# Patient Record
Sex: FEMALE | Race: ASIAN
[De-identification: names, ages, dates, MRNs, and addresses within clinical notes are randomized per-mention and may not be internally consistent; named-entity substitution may affect disease eponyms.]

---

## 2018-12-01 ENCOUNTER — PRIOR ORIGINAL RECORDS (OUTPATIENT)
Dept: HEALTH INFORMATION MANAGEMENT | Facility: OTHER | Age: 28
End: 2018-12-01

## 2020-01-20 ENCOUNTER — TELEPHONE (OUTPATIENT)
Dept: SCHEDULING | Age: 30
End: 2020-01-20

## 2020-01-22 ENCOUNTER — OFFICE VISIT (OUTPATIENT)
Dept: FAMILY MEDICINE | Age: 30
End: 2020-01-22

## 2020-01-22 VITALS
TEMPERATURE: 98 F | DIASTOLIC BLOOD PRESSURE: 66 MMHG | HEART RATE: 84 BPM | HEIGHT: 60 IN | BODY MASS INDEX: 24.23 KG/M2 | OXYGEN SATURATION: 98 % | SYSTOLIC BLOOD PRESSURE: 98 MMHG | RESPIRATION RATE: 17 BRPM | WEIGHT: 123.4 LBS

## 2020-01-22 DIAGNOSIS — M54.16 LUMBAR RADICULOPATHY, ACUTE: Primary | ICD-10-CM

## 2020-01-22 PROCEDURE — 99203 OFFICE O/P NEW LOW 30 MIN: CPT | Performed by: FAMILY MEDICINE

## 2020-01-22 RX ORDER — IBUPROFEN 600 MG/1
600 TABLET ORAL EVERY 8 HOURS
Qty: 90 TABLET | Refills: 0 | Status: SHIPPED | OUTPATIENT
Start: 2020-01-22

## 2020-01-22 RX ORDER — PREDNISONE 20 MG/1
20 TABLET ORAL 2 TIMES DAILY
Qty: 10 TABLET | Refills: 0 | Status: SHIPPED | OUTPATIENT
Start: 2020-01-22

## 2020-01-22 ASSESSMENT — PATIENT HEALTH QUESTIONNAIRE - PHQ9
SUM OF ALL RESPONSES TO PHQ9 QUESTIONS 1 AND 2: 0
SUM OF ALL RESPONSES TO PHQ9 QUESTIONS 1 AND 2: 0
2. FEELING DOWN, DEPRESSED OR HOPELESS: NOT AT ALL
1. LITTLE INTEREST OR PLEASURE IN DOING THINGS: NOT AT ALL

## 2020-01-22 ASSESSMENT — ENCOUNTER SYMPTOMS
APPETITE CHANGE: 0
ABDOMINAL PAIN: 0
BACK PAIN: 1
EYE DISCHARGE: 0
PERIANAL NUMBNESS: 0
WEIGHT LOSS: 0
BOWEL INCONTINENCE: 0
HEADACHES: 0
ACTIVITY CHANGE: 0
TINGLING: 1
EYE ITCHING: 0
NUMBNESS: 1
WEAKNESS: 0
PARESTHESIAS: 1
PARESIS: 0

## 2020-01-30 ENCOUNTER — TELEPHONE (OUTPATIENT)
Dept: SCHEDULING | Age: 30
End: 2020-01-30

## 2020-02-04 ENCOUNTER — OFFICE VISIT (OUTPATIENT)
Dept: FAMILY MEDICINE | Age: 30
End: 2020-02-04

## 2020-02-04 VITALS
BODY MASS INDEX: 24.15 KG/M2 | OXYGEN SATURATION: 98 % | WEIGHT: 123 LBS | HEIGHT: 60 IN | DIASTOLIC BLOOD PRESSURE: 71 MMHG | SYSTOLIC BLOOD PRESSURE: 113 MMHG | RESPIRATION RATE: 20 BRPM | HEART RATE: 81 BPM | TEMPERATURE: 98 F

## 2020-02-04 DIAGNOSIS — M54.16 LUMBAR RADICULOPATHY, ACUTE: Primary | ICD-10-CM

## 2020-02-04 PROCEDURE — 99213 OFFICE O/P EST LOW 20 MIN: CPT | Performed by: FAMILY MEDICINE

## 2020-02-04 RX ORDER — MELOXICAM 7.5 MG/1
7.5 TABLET ORAL DAILY
Qty: 30 TABLET | Refills: 0 | Status: SHIPPED | OUTPATIENT
Start: 2020-02-04

## 2020-02-04 ASSESSMENT — ENCOUNTER SYMPTOMS
EYE ITCHING: 0
ACTIVITY CHANGE: 0
APPETITE CHANGE: 0
EYE DISCHARGE: 0

## 2020-02-19 ENCOUNTER — HOSPITAL (OUTPATIENT)
Dept: OTHER | Age: 30
End: 2020-02-19
Attending: FAMILY MEDICINE

## 2020-03-01 ENCOUNTER — HOSPITAL (OUTPATIENT)
Dept: OTHER | Age: 30
End: 2020-03-01
Attending: FAMILY MEDICINE

## 2020-03-26 ENCOUNTER — HOSPITAL (OUTPATIENT)
Dept: OTHER | Age: 30
End: 2020-03-26
Attending: FAMILY MEDICINE

## 2020-04-01 ENCOUNTER — HOSPITAL (OUTPATIENT)
Dept: OTHER | Age: 30
End: 2020-04-01
Attending: FAMILY MEDICINE

## 2020-04-02 ENCOUNTER — HOSPITAL (OUTPATIENT)
Dept: OTHER | Age: 30
End: 2020-04-02
Attending: FAMILY MEDICINE

## 2023-12-05 ASSESSMENT — ENCOUNTER SYMPTOMS
EYE REDNESS: 0
POLYPHAGIA: 0
CHILLS: 0
EYE DISCHARGE: 0
HEADACHES: 0
COUGH: 0
SHORTNESS OF BREATH: 0
DIZZINESS: 0
HEMATURIA: 0
FATIGUE: 0
UNEXPECTED WEIGHT CHANGE: 0
POLYDIPSIA: 0
TROUBLE SWALLOWING: 0
BLOOD IN STOOL: 0
PALPITATIONS: 0
APPETITE CHANGE: 0
EYE PAIN: 0
DYSURIA: 0
FEVER: 0
HALLUCINATIONS: 0
BACK PAIN: 0
FREQUENCY: 0
ADENOPATHY: 0
NECK PAIN: 0
SORE THROAT: 0
ABDOMINAL PAIN: 0
CONFUSION: 0
WOUND: 0
BRUISES/BLEEDS EASILY: 0
VOMITING: 0

## 2023-12-05 NOTE — PROGRESS NOTES
"Subjective   Patient ID: Bob Santana is a 33 y.o. female who presents for Annual Exam.    new pt-erika-last pcp, gi, ob gyn, others? Dr. Joseph   Put names of providers in care team-not last pcp  last physical- a year   last labs- awhile ago  is pt fasting? No   Does the pt want a flu shot?  No   Last tdap- 9 months ago   last pap-dr joseph 7/15/22; due 7/2027    How long has back pain been worse? In couple days   Lower back? Lower back into hips. More towards left hip   Any fall or injury? No fall or injury     Family history form      No care team member to display    HPI  Last labs->1yr  Due for labs- all    No results found for: \"CHOL\"  No results found for: \"TRIG\"  No results found for: \"HDL\"  No results found for: \"LDL\"  No results found for: \"TSH\"  No results found for: \"A1C\"  No components found for: \"POCA1C\"  No results found for: \"ALBUR\"  No components found for: \"POCALBUR\"      Other concerns:back pain x 5-6yrs  Worse lower back pain x 2d with radiation into lf hip  When putting baby down spasm of pain and hard to walk  Exercise causes pain  No fall or injury   numbness or tingling down legs lf >rt when pain worse  leg weakness noted lf >rt  Right now pain is4-5/10  Worse if lift baby or bend forward or sitting or lying down  Selftxt:chiropractor    Neck pain on off especially behind lf ear-twitches and lf arm numb  Triggers anxiety  No pain right now    Right now just top of head  Bad HA yesterday    Abd pain and back pain after period    bps at home- none    ER/urgicare visits in the last year- 2 times during pregnancy  Hospitalizations in the last year- none      last Pap- last yr  H/o abn pap-none    FH ovarian, endometrial, cervical, uterine ca-none    Current birth control method-not sexually active right now  No change in contraception desired      FH br ca-none    FH colon ca-none      Exercise- treadmill, elliptical  Diet-3 meals a day   Body mass index is 28.02 kg/m².        last dental appt- 4-5 " yrs    BMs-regular  Sleep-able to fall asleep and stay asleep; no snoring or apnea  no cp, swelling, sob, abd pain, n/v/d/c, blood in stool or black stools  STI testing including hiv (age 15-65) and hep c screening (18-79)-declines        Immunization History   Administered Date(s) Administered    Pfizer Purple Cap SARS-CoV-2 04/22/2021, 05/14/2021, 01/27/2022    Tdap vaccine, age 7 year and older (BOOSTRIX) 02/08/2023       fractures in lifetime-none      FH heart attack, heart surgery-mat gf  FH stroke-none    The ASCVD Risk score (Robel DK, et al., 2019) failed to calculate for the following reasons:    The 2019 ASCVD risk score is only valid for ages 40 to 79  Coronary Artery Calcium score:  This test is recommended for men 45 or older and women 55 or older without a history of heart disease and have 1 risk factor (high LDL cholesterol, low HDL cholesterol, high blood pressure, smoker (current or past), type 2 diabetes, IBD, lupus, RA, ankylosing spondylitis, psoriasis or family history of  heart disease <55yrs in dad, brother or child or <65yrs in mom, sister, or child.)       Review of Systems   Constitutional:  Negative for appetite change, chills, fatigue, fever and unexpected weight change.   HENT:  Negative for congestion, ear pain, sore throat and trouble swallowing.    Eyes:  Negative for pain, discharge and redness.   Respiratory:  Negative for cough and shortness of breath.    Cardiovascular:  Negative for chest pain and palpitations.   Gastrointestinal:  Negative for abdominal pain, blood in stool and vomiting.   Endocrine: Negative for polydipsia, polyphagia and polyuria.   Genitourinary:  Negative for dysuria, frequency, hematuria and urgency.   Musculoskeletal:  Negative for back pain and neck pain.   Skin:  Negative for rash and wound.   Allergic/Immunologic: Negative for immunocompromised state.   Neurological:  Negative for dizziness, syncope and headaches.   Hematological:  Negative for  adenopathy. Does not bruise/bleed easily.   Psychiatric/Behavioral:  Negative for confusion and hallucinations.        Objective   Visit Vitals  /74   Pulse 68   Temp 36.8 °C (98.2 °F)      BP Readings from Last 3 Encounters:   12/06/23 109/74     Wt Readings from Last 3 Encounters:   12/06/23 69.5 kg (153 lb 3.2 oz)           Physical Exam  Constitutional:       General: She is not in acute distress.     Appearance: Normal appearance. She is not ill-appearing.   HENT:      Head: Normocephalic.      Right Ear: Tympanic membrane, ear canal and external ear normal.      Left Ear: Tympanic membrane, ear canal and external ear normal.      Nose: Nose normal.      Mouth/Throat:      Mouth: Mucous membranes are moist.      Pharynx: Oropharynx is clear.   Eyes:      Extraocular Movements: Extraocular movements intact.      Conjunctiva/sclera: Conjunctivae normal.      Pupils: Pupils are equal, round, and reactive to light.   Cardiovascular:      Rate and Rhythm: Normal rate and regular rhythm.      Heart sounds: Normal heart sounds. No murmur heard.  Pulmonary:      Effort: Pulmonary effort is normal. No respiratory distress.      Breath sounds: Normal breath sounds. No wheezing, rhonchi or rales.   Abdominal:      General: Bowel sounds are normal.      Palpations: Abdomen is soft. There is no mass.      Tenderness: There is no abdominal tenderness.   Musculoskeletal:         General: No swelling or tenderness. Normal range of motion.      Cervical back: Normal range of motion and neck supple.      Right lower leg: No edema.      Left lower leg: No edema.   Skin:     General: Skin is warm.      Findings: No rash.   Neurological:      General: No focal deficit present.      Mental Status: She is alert and oriented to person, place, and time.      Cranial Nerves: No cranial nerve deficit.      Motor: No weakness.   Psychiatric:         Mood and Affect: Mood normal.         Behavior: Behavior normal.          Assessment/Plan   Diagnoses and all orders for this visit:  Routine general medical examination at a health care facility  -     Comprehensive Metabolic Panel; Future  -     CBC and Auto Differential; Future  -     Lipid Panel; Future  -     TSH with reflex to Free T4 if abnormal; Future  BMI 28.0-28.9,adult  Neck pain  -     predniSONE (Deltasone) 20 mg tablet; 3 tabs daily x3d then 2 tabs daily x 3d then 1 tab daily x 3d then 1/2 tab daily x 3d with food  -     orphenadrine (Norflex) 100 mg 12 hr tablet; Take 1 tablet (100 mg) by mouth 2 times a day as needed for muscle spasms or mild pain (1 - 3). Do not crush, chew, or split.  Acute bilateral low back pain with bilateral sciatica  -     predniSONE (Deltasone) 20 mg tablet; 3 tabs daily x3d then 2 tabs daily x 3d then 1 tab daily x 3d then 1/2 tab daily x 3d with food  -     orphenadrine (Norflex) 100 mg 12 hr tablet; Take 1 tablet (100 mg) by mouth 2 times a day as needed for muscle spasms or mild pain (1 - 3). Do not crush, chew, or split.  -     Referral to Physical Therapy; Future  Other orders  -     Follow Up In Primary Care - Health Maintenance; Future

## 2023-12-06 ENCOUNTER — OFFICE VISIT (OUTPATIENT)
Dept: PRIMARY CARE | Facility: CLINIC | Age: 33
End: 2023-12-06
Payer: COMMERCIAL

## 2023-12-06 VITALS
TEMPERATURE: 98.2 F | WEIGHT: 153.2 LBS | HEIGHT: 62 IN | OXYGEN SATURATION: 96 % | SYSTOLIC BLOOD PRESSURE: 109 MMHG | BODY MASS INDEX: 28.19 KG/M2 | DIASTOLIC BLOOD PRESSURE: 74 MMHG | HEART RATE: 68 BPM

## 2023-12-06 DIAGNOSIS — M54.2 NECK PAIN: ICD-10-CM

## 2023-12-06 DIAGNOSIS — M54.42 ACUTE BILATERAL LOW BACK PAIN WITH BILATERAL SCIATICA: ICD-10-CM

## 2023-12-06 DIAGNOSIS — M54.41 ACUTE BILATERAL LOW BACK PAIN WITH BILATERAL SCIATICA: ICD-10-CM

## 2023-12-06 DIAGNOSIS — Z00.00 ROUTINE GENERAL MEDICAL EXAMINATION AT A HEALTH CARE FACILITY: Primary | ICD-10-CM

## 2023-12-06 PROBLEM — M54.50 CHRONIC LOWER BACK PAIN: Status: ACTIVE | Noted: 2023-12-06

## 2023-12-06 PROBLEM — G89.29 CHRONIC LOWER BACK PAIN: Status: ACTIVE | Noted: 2023-12-06

## 2023-12-06 PROBLEM — G89.29 CHRONIC NECK PAIN: Status: ACTIVE | Noted: 2023-12-06

## 2023-12-06 PROCEDURE — 3008F BODY MASS INDEX DOCD: CPT | Performed by: NURSE PRACTITIONER

## 2023-12-06 PROCEDURE — 1036F TOBACCO NON-USER: CPT | Performed by: NURSE PRACTITIONER

## 2023-12-06 PROCEDURE — 99385 PREV VISIT NEW AGE 18-39: CPT | Performed by: NURSE PRACTITIONER

## 2023-12-06 RX ORDER — PREDNISONE 20 MG/1
TABLET ORAL
Qty: 20 TABLET | Refills: 0 | Status: SHIPPED | OUTPATIENT
Start: 2023-12-06 | End: 2023-12-21 | Stop reason: ALTCHOICE

## 2023-12-06 RX ORDER — ORPHENADRINE CITRATE 100 MG/1
100 TABLET, EXTENDED RELEASE ORAL 2 TIMES DAILY PRN
Qty: 60 TABLET | Refills: 5 | Status: SHIPPED | OUTPATIENT
Start: 2023-12-06 | End: 2024-06-03

## 2023-12-06 ASSESSMENT — PATIENT HEALTH QUESTIONNAIRE - PHQ9
SUM OF ALL RESPONSES TO PHQ9 QUESTIONS 1 AND 2: 0
2. FEELING DOWN, DEPRESSED OR HOPELESS: NOT AT ALL
1. LITTLE INTEREST OR PLEASURE IN DOING THINGS: NOT AT ALL

## 2023-12-06 NOTE — PATIENT INSTRUCTIONS
Core exercises  Prednisone with food  No advil, motrin, ibuprofen, aleve, naproxen or other anti-inflammatories while on prednisone.  Tylenol (acetaminophen) is OK to take.   Muscle relaxant  Heat  Continue chiropractor if desire  Physical therapy if med not helping in 1-2wks or symptoms worsen    See dentist    Lotrimin otc for itchy lower abdomen    Discuss with dr joseph abdominal and back pain after period    Zyrtec otc 1 a day for sinus symptoms    Handouts given to pt:  physical handout      Labs:    You can use the lab in our building when fasting. The hrs are: Monday-Friday, 7 a.m. - 5 p.m., Saturday 8 a.m. - 12 noon.   No appt needed, BUT YOU DO NEED THE PAPER ORDER.    Fasting is no food, drink, gum or mints other than water for 12 hrs.   Results will be back in 2-3 business days for most labs. It is always recommended for any orders (labs, xrays, ultrasounds,MRI, ct scan, procedures etc) to check with your insurance provider for expected costs or expenses to you.         You will get your results via phone from my medical assistant if you do not have MyChart.  OR  You will get your results via [employer]    If a result is urgent, I will call to speak to you.    Vaccines:  ---- flu vaccine-declines        General recommendations:  Exercise-cardio 4-5d/wk 30min each day  Diet-Breakfast-toast (my favorite Johana Almendarez Delighful Multigrain or Rolando's Killer Bread Good Seed thin-sliced)/bagel/English muffin-whole wheat flour as a 1st ingredient or cereal/oatmeal/granola bar-fiber 4g or more or protein like eggs or peanut butter; optional veggies  Lunch-protein, 1/2c carb or 2 slices bread, veg 1c  Dinner-protein, fist sized carb, veg 1c  Fruit 2 a day  Dairy 2 a day-milk, soy milk, almond milk, cheese, yogurt, cottage cheese  Snacks-Protein-hard boiled egg, nuts (walnuts/almonds/pecans/pistachios 1/4c), hummus, beef/deer jerky or meat sticks; vegetable, fruit, dairy-milk(1%, skim, almond, soy)/cheese (not a lot of  cheddar)/yogurt (Greek is best-my favorite Dannon Fruit on the Bottom Greek)/cottage cheese 2%; triscuits/ popcorn/wheat thins have a lot of fiber; follow serving size on bag/box/container  increase water  Limit alcohol to 1 drink per day for women and 2 drinks per day for men (1 drink=12oz beer or 5oz wine or 1 1/2oz liquor)  Calcium: 500mg 1 twice a day if age 50 and younger and 600mg 1 twice a day if over age 50 (calcium citrate can be taken without food)  Vitamin D: 800-5000 IU/day  Limit salt to <2300mg a day if age 50 and under and <1500mg a day if over age 50/have high bp or diabetes or kidney disease  Recommend folate for childbearing age women 0.4mg per day (can be found in a multivitamin)  Recommend 18mg/dL of iron a day if age 50 and under and 8mg/dL a day if over age 50; take on an empty stomach at bedtime  Use sunscreen   Wear seatbelt  Recommend safe sex practices: using condoms everytime you have sex, discuss with a new partner about their past partners/history of STDs/drug use, avoid drinking alcohol or using drugs as this increases the chance that you will participate in high-risk sex, for oral sex help protect your mouth by having your partner use a condom (male or female), women should not douche after sex, be aware of your partner's body and your body-look for signs of a sore, blister, rash, or discharge, and have regular exams and periodic tests for STDs.  No distracted driving  No driving when under influence of substances  Wear a seatbelt  Eye dr every 1-2yrs  Dentist every 6-12 mon  Tetanus shot every 10yrs  Recommend flu vaccine in the fall  Appt in  1 year for physical      I will communicate with you via [employer] regarding messages and results. If you need help with this, you can call the support line at [phone].    IT WAS A PLEASURE TO SEE YOU TODAY. THANK YOU FOR CHOOSING US FOR YOUR HEALTHCARE NEEDS.

## 2023-12-09 LAB
NON-UH HIE A/G RATIO: 0.9
NON-UH HIE ALB: 3.8 G/DL (ref 3.4–5)
NON-UH HIE ALK PHOS: 121 UNIT/L (ref 45–117)
NON-UH HIE BASO COUNT: 0.06 X1000 (ref 0–0.2)
NON-UH HIE BASOS %: 0.6 %
NON-UH HIE BILIRUBIN, TOTAL: 0.5 MG/DL (ref 0.3–1.2)
NON-UH HIE BUN/CREAT RATIO: 20
NON-UH HIE BUN: 12 MG/DL (ref 9–23)
NON-UH HIE CALCIUM: 9.3 MG/DL (ref 8.7–10.4)
NON-UH HIE CALCULATED LDL CHOLESTEROL: 143 MG/DL (ref 60–130)
NON-UH HIE CALCULATED OSMOLALITY: 277 MOSM/KG (ref 275–295)
NON-UH HIE CHLORIDE: 105 MMOL/L (ref 98–107)
NON-UH HIE CHOLESTEROL: 220 MG/DL (ref 100–200)
NON-UH HIE CO2, VENOUS: 26 MMOL/L (ref 20–31)
NON-UH HIE CREATININE: 0.6 MG/DL (ref 0.5–0.8)
NON-UH HIE DIFF?: NO
NON-UH HIE DXH ACTIONS: ABNORMAL
NON-UH HIE EOS COUNT: 0.03 X1000 (ref 0–0.5)
NON-UH HIE EOSIN %: 0.2 %
NON-UH HIE GFR AA: >60
NON-UH HIE GLOBULIN: 4.1 G/DL
NON-UH HIE GLOMERULAR FILTRATION RATE: >60 ML/MIN/1.73M?
NON-UH HIE GLUCOSE: 95 MG/DL (ref 74–106)
NON-UH HIE GOT: 79 UNIT/L (ref 15–37)
NON-UH HIE GPT: 170 UNIT/L (ref 10–49)
NON-UH HIE HCT: 41.6 % (ref 36–46)
NON-UH HIE HDL CHOLESTEROL: 60 MG/DL (ref 40–60)
NON-UH HIE HGB: 13.8 G/DL (ref 12–16)
NON-UH HIE INSTR WBC: 11.6
NON-UH HIE K: 3.8 MMOL/L (ref 3.5–5.1)
NON-UH HIE LYMPH %: 23.6 %
NON-UH HIE LYMPH COUNT: 2.74 X1000 (ref 1.2–4.8)
NON-UH HIE MCH: 29.8 PG (ref 27–34)
NON-UH HIE MCHC: 33.1 G/DL (ref 32–37)
NON-UH HIE MCV: 89.9 FL (ref 80–100)
NON-UH HIE MONO %: 7.2 %
NON-UH HIE MONO COUNT: 0.84 X1000 (ref 0.1–1)
NON-UH HIE MPV: 9.2 FL (ref 7.4–10.4)
NON-UH HIE NA: 139 MMOL/L (ref 135–145)
NON-UH HIE NEUTROPHIL %: 68.4 %
NON-UH HIE NEUTROPHIL COUNT (ANC): 7.93 X1000 (ref 1.4–8.8)
NON-UH HIE NUCLEATED RBC: 0 /100WBC
NON-UH HIE PLATELET: 289 X10 (ref 150–450)
NON-UH HIE RBC: 4.63 X10 (ref 4.2–5.4)
NON-UH HIE RDW: 12.6 % (ref 11.5–14.5)
NON-UH HIE SCAN DIFFERENTIAL: NORMAL
NON-UH HIE SPECIAL NOTES: ABNORMAL
NON-UH HIE TOTAL CHOL/HDL CHOL RATIO: 3.7
NON-UH HIE TOTAL PROTEIN: 7.9 G/DL (ref 5.7–8.2)
NON-UH HIE TRIGLYCERIDES: 83 MG/DL (ref 30–150)
NON-UH HIE TSH: 0.69 UIU/ML (ref 0.55–4.78)
NON-UH HIE WBC: 11.6 X10 (ref 4.5–11)

## 2023-12-11 DIAGNOSIS — D72.829 LEUKOCYTOSIS, UNSPECIFIED TYPE: Primary | ICD-10-CM

## 2023-12-11 DIAGNOSIS — R74.8 ELEVATED LIVER ENZYMES: ICD-10-CM

## 2023-12-11 DIAGNOSIS — R74.8 ELEVATED ALKALINE PHOSPHATASE LEVEL: ICD-10-CM

## 2023-12-21 ENCOUNTER — OFFICE VISIT (OUTPATIENT)
Dept: PRIMARY CARE | Facility: CLINIC | Age: 33
End: 2023-12-21
Payer: COMMERCIAL

## 2023-12-21 VITALS
OXYGEN SATURATION: 98 % | WEIGHT: 157 LBS | DIASTOLIC BLOOD PRESSURE: 81 MMHG | SYSTOLIC BLOOD PRESSURE: 120 MMHG | TEMPERATURE: 97.1 F | HEIGHT: 62 IN | HEART RATE: 99 BPM | BODY MASS INDEX: 28.89 KG/M2

## 2023-12-21 DIAGNOSIS — E78.00 PURE HYPERCHOLESTEROLEMIA: Primary | ICD-10-CM

## 2023-12-21 LAB
NON-UH HIE ALB: 3.7 G/DL (ref 3.4–5)
NON-UH HIE ALK PHOS: 103 UNIT/L (ref 45–117)
NON-UH HIE BASO COUNT: 0.05 X1000 (ref 0–0.2)
NON-UH HIE BASOS %: 0.4 %
NON-UH HIE BILIRUBIN, DIRECT: 0.1 MG/DL (ref 0–0.4)
NON-UH HIE BILIRUBIN, TOTAL: 0.4 MG/DL (ref 0.3–1.2)
NON-UH HIE DIFF?: NO
NON-UH HIE EOS COUNT: 0.25 X1000 (ref 0–0.5)
NON-UH HIE EOSIN %: 2 %
NON-UH HIE GOT: 32 UNIT/L (ref 15–37)
NON-UH HIE GPT: 96 UNIT/L (ref 10–49)
NON-UH HIE HCT: 42.5 % (ref 36–46)
NON-UH HIE HGB: 13.9 G/DL (ref 12–16)
NON-UH HIE INSTR WBC: 12.5
NON-UH HIE LYMPH %: 28.6 %
NON-UH HIE LYMPH COUNT: 3.57 X1000 (ref 1.2–4.8)
NON-UH HIE MCH: 29.9 PG (ref 27–34)
NON-UH HIE MCHC: 32.7 G/DL (ref 32–37)
NON-UH HIE MCV: 91.3 FL (ref 80–100)
NON-UH HIE MONO %: 8 %
NON-UH HIE MONO COUNT: 1 X1000 (ref 0.1–1)
NON-UH HIE MPV: 9.1 FL (ref 7.4–10.4)
NON-UH HIE NEUTROPHIL %: 61.1 %
NON-UH HIE NEUTROPHIL COUNT (ANC): 7.65 X1000 (ref 1.4–8.8)
NON-UH HIE NUCLEATED RBC: 0 /100WBC
NON-UH HIE PLATELET: 204 X10 (ref 150–450)
NON-UH HIE RBC: 4.65 X10 (ref 4.2–5.4)
NON-UH HIE RDW: 13.3 % (ref 11.5–14.5)
NON-UH HIE TOTAL PROTEIN: 7.6 G/DL (ref 5.7–8.2)
NON-UH HIE WBC: 12.5 X10 (ref 4.5–11)

## 2023-12-21 PROCEDURE — 3008F BODY MASS INDEX DOCD: CPT | Performed by: NURSE PRACTITIONER

## 2023-12-21 PROCEDURE — 99214 OFFICE O/P EST MOD 30 MIN: CPT | Performed by: NURSE PRACTITIONER

## 2023-12-21 PROCEDURE — 1036F TOBACCO NON-USER: CPT | Performed by: NURSE PRACTITIONER

## 2023-12-21 ASSESSMENT — ENCOUNTER SYMPTOMS
CONSTITUTIONAL NEGATIVE: 1
WHEEZING: 0
SHORTNESS OF BREATH: 0

## 2023-12-21 NOTE — PROGRESS NOTES
"Subjective   Patient ID: Bob Santana is a 33 y.o. female who presents for Follow-up.  Last physical: 12/6/23  Does pt want flu shot? No     BPs at home (put an avg of the numbers)-not taking       Any other questions or concerns that pt wants to discuss today? No       HPI  Last labs-12/9/23 Sugar (aka glucose), kidney function,  and electrolytes in the CMP (comprehensive metabolic panel) were normal.  Three liver enzymes are high. Decrease alcohol and tylenol (acetaminophen) if applicable.  Recheck lab in 2wks. You do not need to be fasting.  Trigs and hdl normal  Ldl (bad cholesterol) high at 143. Goal <100. Decr fats and incr fiber.  Make an appt with me to discuss how to lower this.   TSH (thyroid test) was normal.  White blood cells high. Recheck in 2wks.  You do not need to be fasting.      Due for labs- cbc, liver-print    No results found for: \"CHOL\"  No results found for: \"TRIG\"  No results found for: \"HDL\"  No results found for: \"LDL\"  No results found for: \"TSH\"  No results found for: \"A1C\"  No components found for: \"POCA1C\"  No results found for: \"ALBUR\"  No components found for: \"POCALBUR\"    Reflux middle of night and early am with generalized chest and abdomen    Exercise-back and neck issues-will be seeing physical therapy  Diet-breakfast-overnight oats, sweet peas, coffee  Lunch-rice, beans, lentils, juice  Dinner-pasta or noodle, lentils, beans  Vegetarian-eats eggs  Snacks-unhealthy-sweet tooth  Etoh-rare    No fh high cholesterol    Immunization History   Administered Date(s) Administered    Pfizer Purple Cap SARS-CoV-2 04/22/2021, 05/14/2021, 01/27/2022    Tdap vaccine, age 7 year and older (BOOSTRIX) 02/08/2023        No care team member to display     Review of Systems   Constitutional: Negative.    Respiratory:  Negative for shortness of breath and wheezing.    Cardiovascular:  Negative for chest pain.       Objective   Visit Vitals  /81   Pulse 99   Temp 36.2 °C (97.1 °F)      BP " Readings from Last 3 Encounters:   12/21/23 120/81   12/06/23 109/74     Wt Readings from Last 3 Encounters:   12/21/23 71.2 kg (157 lb)   12/06/23 69.5 kg (153 lb 3.2 oz)       The ASCVD Risk score (Robel VO, et al., 2019) failed to calculate for the following reasons:    The 2019 ASCVD risk score is only valid for ages 40 to 79     Physical Exam  Constitutional:       General: She is not in acute distress.     Appearance: Normal appearance.   Neurological:      Mental Status: She is alert.       Assessment/Plan   Diagnoses and all orders for this visit:  Pure hypercholesterolemia  -     Lipid Panel; Future  Other orders  -     Follow Up In Primary Care - Established; Future

## 2023-12-21 NOTE — PATIENT INSTRUCTIONS
See physical therapy for back and neck    Pepcid as needed otc for reflux  If having daily symptoms you can take otc prilosec (omeprazole generic is ok) or I can prescribe a similar medication if less costly.    Recheck cholesterol in 3mon.    Goal LDL <100 (143)  Goal trigs <150  Goal HDL >50  Exercise-cardio 4-5d/wk 30min each day  Diet-Breakfast-toast (my favorite Johana Almendarez Delightful multi-grain and Rolando's Killer Bread thin-sliced Good Seed)/bagel/English muffin-whole wheat flour as a 1st ingredient or cereal/oatmeal/granola bar-fiber 4g or more; protein like eggs or peanut butter is Ok  Lunch-protein, veg 1c  Dinner-protein, veg 1c; if having potatoes, rice, noodles, or pasta-->fist sized; could try brown rice or whole wheat pasta or quinoa  Fruit 2 a day  Dairy 2 a day-milk, almond milk, soy milk, yogurt, cottage cheese, yogurt  Snacks-Protein-hard boiled egg, nuts (walnuts/almonds/pecans/pistachios 1/4c), hummus, beef/deer jerky; vegetable, fruit, dairy-milk(1%, skim, almond, soy)/cheese (not a lot of cheddar)/yogurt (Greek is best-my favorite Dannon Fruit on the Bottom Greek)/cottage cheese 2%; triscuits, popcorn have a lot of fiber; serving size  Water  Limit alcohol to 2 drinks per day (1 drink=12oz beer or 5oz wine or 1 1/2oz liquor)  appt in  6  months; be fasting      I will communicate with you via [employer] regarding messages and results. If you need help with this, you can call the support line at [phone].    IT WAS A PLEASURE TO SEE YOU TODAY. THANK YOU FOR CHOOSING US FOR YOUR HEALTHCARE NEEDS.

## 2023-12-25 DIAGNOSIS — R74.8 ELEVATED LIVER ENZYMES: Primary | ICD-10-CM

## 2023-12-25 DIAGNOSIS — D72.829 LEUKOCYTOSIS, UNSPECIFIED TYPE: ICD-10-CM

## 2024-01-04 ENCOUNTER — PATIENT MESSAGE (OUTPATIENT)
Dept: PRIMARY CARE | Facility: CLINIC | Age: 34
End: 2024-01-04
Payer: COMMERCIAL

## 2024-01-04 DIAGNOSIS — M54.2 NECK PAIN: Primary | ICD-10-CM

## 2024-01-05 ENCOUNTER — APPOINTMENT (OUTPATIENT)
Dept: PHYSICAL THERAPY | Facility: CLINIC | Age: 34
End: 2024-01-05

## 2024-01-05 ENCOUNTER — ANCILLARY PROCEDURE (OUTPATIENT)
Dept: RADIOLOGY | Facility: CLINIC | Age: 34
End: 2024-01-05
Payer: COMMERCIAL

## 2024-01-05 DIAGNOSIS — R74.8 ELEVATED LIVER ENZYMES: ICD-10-CM

## 2024-01-05 PROCEDURE — 76705 ECHO EXAM OF ABDOMEN: CPT | Performed by: RADIOLOGY

## 2024-01-05 PROCEDURE — 76705 ECHO EXAM OF ABDOMEN: CPT

## 2024-01-07 DIAGNOSIS — K76.0 FATTY LIVER: Primary | ICD-10-CM

## 2024-02-09 ENCOUNTER — APPOINTMENT (OUTPATIENT)
Dept: HEMATOLOGY/ONCOLOGY | Facility: CLINIC | Age: 34
End: 2024-02-09
Payer: COMMERCIAL

## 2024-05-10 ENCOUNTER — APPOINTMENT (OUTPATIENT)
Dept: HEMATOLOGY/ONCOLOGY | Facility: CLINIC | Age: 34
End: 2024-05-10
Payer: COMMERCIAL